# Patient Record
Sex: FEMALE | Employment: UNEMPLOYED | URBAN - METROPOLITAN AREA
[De-identification: names, ages, dates, MRNs, and addresses within clinical notes are randomized per-mention and may not be internally consistent; named-entity substitution may affect disease eponyms.]

---

## 2017-09-11 PROCEDURE — 84480 ASSAY TRIIODOTHYRONINE (T3): CPT | Performed by: INTERNAL MEDICINE

## 2017-09-19 PROBLEM — Z17.0 MALIGNANT NEOPLASM OF LEFT BREAST IN FEMALE, ESTROGEN RECEPTOR POSITIVE, UNSPECIFIED SITE OF BREAST (HCC): Status: ACTIVE | Noted: 2017-09-19

## 2017-09-19 PROBLEM — C50.912 MALIGNANT NEOPLASM OF LEFT BREAST IN FEMALE, ESTROGEN RECEPTOR POSITIVE, UNSPECIFIED SITE OF BREAST (HCC): Status: ACTIVE | Noted: 2017-09-19

## 2017-09-19 PROBLEM — C50.912 MALIGNANT NEOPLASM OF LEFT BREAST IN FEMALE, ESTROGEN RECEPTOR POSITIVE, UNSPECIFIED SITE OF BREAST: Status: ACTIVE | Noted: 2017-09-19

## 2017-09-19 PROBLEM — C50.912 MALIGNANT NEOPLASM OF LEFT BREAST IN FEMALE, ESTROGEN RECEPTOR POSITIVE, UNSPECIFIED SITE OF BREAST  (HCC): Status: ACTIVE | Noted: 2017-09-19

## 2017-09-19 PROBLEM — Z17.0 MALIGNANT NEOPLASM OF LEFT BREAST IN FEMALE, ESTROGEN RECEPTOR POSITIVE, UNSPECIFIED SITE OF BREAST  (HCC): Status: ACTIVE | Noted: 2017-09-19

## 2017-09-19 PROBLEM — Z17.0 MALIGNANT NEOPLASM OF LEFT BREAST IN FEMALE, ESTROGEN RECEPTOR POSITIVE, UNSPECIFIED SITE OF BREAST: Status: ACTIVE | Noted: 2017-09-19

## 2018-04-17 PROCEDURE — 82607 VITAMIN B-12: CPT | Performed by: INTERNAL MEDICINE

## 2018-04-17 PROCEDURE — 84439 ASSAY OF FREE THYROXINE: CPT | Performed by: INTERNAL MEDICINE

## 2018-04-17 PROCEDURE — 82746 ASSAY OF FOLIC ACID SERUM: CPT | Performed by: INTERNAL MEDICINE

## 2018-04-17 PROCEDURE — 82533 TOTAL CORTISOL: CPT | Performed by: INTERNAL MEDICINE

## 2018-06-18 PROBLEM — H69.93 EUSTACHIAN TUBE DYSFUNCTION, BILATERAL: Status: ACTIVE | Noted: 2018-06-18

## 2018-06-18 PROBLEM — J30.1 NON-SEASONAL ALLERGIC RHINITIS DUE TO POLLEN: Status: ACTIVE | Noted: 2018-06-18

## 2018-06-18 PROBLEM — H69.83 EUSTACHIAN TUBE DYSFUNCTION, BILATERAL: Status: ACTIVE | Noted: 2018-06-18

## 2019-05-23 PROBLEM — E03.9 HYPOTHYROIDISM: Status: ACTIVE | Noted: 2019-05-23

## 2021-01-20 PROCEDURE — 88305 TISSUE EXAM BY PATHOLOGIST: CPT | Performed by: OBSTETRICS & GYNECOLOGY

## 2021-03-29 DIAGNOSIS — Z11.59 SPECIAL SCREENING EXAMINATION FOR UNSPECIFIED VIRAL DISEASE: Primary | ICD-10-CM

## 2021-03-31 ENCOUNTER — LAB SERVICES (OUTPATIENT)
Dept: LAB | Age: 51
End: 2021-03-31

## 2021-03-31 PROCEDURE — U0005 INFEC AGEN DETEC AMPLI PROBE: HCPCS | Performed by: INTERNAL MEDICINE

## 2021-03-31 PROCEDURE — U0003 INFECTIOUS AGENT DETECTION BY NUCLEIC ACID (DNA OR RNA); SEVERE ACUTE RESPIRATORY SYNDROME CORONAVIRUS 2 (SARS-COV-2) (CORONAVIRUS DISEASE [COVID-19]), AMPLIFIED PROBE TECHNIQUE, MAKING USE OF HIGH THROUGHPUT TECHNOLOGIES AS DESCRIBED BY CMS-2020-01-R: HCPCS | Performed by: INTERNAL MEDICINE

## 2021-04-01 LAB
SARS-COV-2 RNA RESP QL NAA+PROBE: NOT DETECTED
SERVICE CMNT-IMP: NORMAL
SERVICE CMNT-IMP: NORMAL

## 2021-10-07 PROBLEM — M18.0 PRIMARY OSTEOARTHRITIS OF BOTH FIRST CARPOMETACARPAL JOINTS: Status: ACTIVE | Noted: 2021-10-07

## 2021-10-08 PROBLEM — R76.8 SS-B ANTIBODY POSITIVE: Status: ACTIVE | Noted: 2021-10-08

## 2022-02-17 PROBLEM — R05.9 COUGH: Status: ACTIVE | Noted: 2022-02-17

## 2023-06-06 ENCOUNTER — OFFICE VISIT (OUTPATIENT)
Dept: INTERNAL MEDICINE CLINIC | Facility: CLINIC | Age: 53
End: 2023-06-06
Payer: COMMERCIAL

## 2023-06-06 ENCOUNTER — LAB ENCOUNTER (OUTPATIENT)
Dept: LAB | Age: 53
End: 2023-06-06
Attending: INTERNAL MEDICINE
Payer: COMMERCIAL

## 2023-06-06 VITALS
DIASTOLIC BLOOD PRESSURE: 70 MMHG | WEIGHT: 169 LBS | HEIGHT: 61 IN | HEART RATE: 69 BPM | SYSTOLIC BLOOD PRESSURE: 124 MMHG | BODY MASS INDEX: 31.91 KG/M2 | RESPIRATION RATE: 18 BRPM | OXYGEN SATURATION: 100 %

## 2023-06-06 DIAGNOSIS — F32.A ANXIETY AND DEPRESSION: ICD-10-CM

## 2023-06-06 DIAGNOSIS — E66.9 CLASS 1 OBESITY WITH SERIOUS COMORBIDITY AND BODY MASS INDEX (BMI) OF 31.0 TO 31.9 IN ADULT, UNSPECIFIED OBESITY TYPE: ICD-10-CM

## 2023-06-06 DIAGNOSIS — F41.9 ANXIETY AND DEPRESSION: ICD-10-CM

## 2023-06-06 DIAGNOSIS — E03.8 OTHER SPECIFIED HYPOTHYROIDISM: ICD-10-CM

## 2023-06-06 DIAGNOSIS — Z51.81 THERAPEUTIC DRUG MONITORING: ICD-10-CM

## 2023-06-06 DIAGNOSIS — E78.5 DYSLIPIDEMIA: ICD-10-CM

## 2023-06-06 DIAGNOSIS — R53.83 OTHER FATIGUE: ICD-10-CM

## 2023-06-06 DIAGNOSIS — Z85.3 HISTORY OF BREAST CANCER: Primary | ICD-10-CM

## 2023-06-06 DIAGNOSIS — Z85.3 HISTORY OF BREAST CANCER: ICD-10-CM

## 2023-06-06 LAB
FOLATE SERPL-MCNC: 11 NG/ML (ref 8.7–?)
VIT B12 SERPL-MCNC: 481 PG/ML (ref 193–986)
VIT D+METAB SERPL-MCNC: 48.4 NG/ML (ref 30–100)

## 2023-06-06 PROCEDURE — 3074F SYST BP LT 130 MM HG: CPT | Performed by: INTERNAL MEDICINE

## 2023-06-06 PROCEDURE — 3078F DIAST BP <80 MM HG: CPT | Performed by: INTERNAL MEDICINE

## 2023-06-06 PROCEDURE — 99204 OFFICE O/P NEW MOD 45 MIN: CPT | Performed by: INTERNAL MEDICINE

## 2023-06-06 PROCEDURE — 82746 ASSAY OF FOLIC ACID SERUM: CPT | Performed by: INTERNAL MEDICINE

## 2023-06-06 PROCEDURE — 82306 VITAMIN D 25 HYDROXY: CPT | Performed by: INTERNAL MEDICINE

## 2023-06-06 PROCEDURE — 3008F BODY MASS INDEX DOCD: CPT | Performed by: INTERNAL MEDICINE

## 2023-06-06 PROCEDURE — 82607 VITAMIN B-12: CPT | Performed by: INTERNAL MEDICINE

## 2023-06-06 RX ORDER — VIT C/B6/B5/MAGNESIUM/HERB 173 50-5-6-5MG
CAPSULE ORAL
COMMUNITY

## 2023-06-06 RX ORDER — PHENTERMINE HYDROCHLORIDE 15 MG/1
15 CAPSULE ORAL EVERY MORNING
Qty: 30 CAPSULE | Refills: 1 | Status: SHIPPED | OUTPATIENT
Start: 2023-06-06

## 2023-06-06 NOTE — PATIENT INSTRUCTIONS
Plan:  Continue with medications:   Go to the lab for blood work   Follow up with me in 1 month  Schedule follow up appointments: Levi Frank (dietitian) or Lei Cushing (presurgery dietitian)   Check for insurance coverage for dietitian and labwork prior to scheduling appointment. Please try to work on the following dietary changes:  1. Drink lots of water and cut down on soda/juice consumption if soda/juice drinker  2. Eat breakfast daily (within 1 hour)  3. Focus on protein: (15-30 grams with each meal) ie. greek yogurt, cottage cheese, string cheese, hard boiled eggs   4. Healthy snacks: peanut butter and apples, hummus and carrots, yogurt and berries, nuts (1/4 cup), tuna and crackers    Premier protein shakes  Quest bars  Power crunch bars   Siggi yogurt (9% milk fat)   Sargento balanced breaks (cheese and nuts)- without chocolate   5. Reduce carbohydrates which includes sweets as well as rice, pasta, potatoes, bread, corn and instead choose whole grain options or more protein or vegetables. 6. Get a good night of sleep  7. Try to decrease stress in life- headspace / calm    Please download apps:  1. \"My Fitness Pal\" (other options are Lose it or Spark People) to help you to monitor daily dietary intake and you will be able to see if you are eating the right amount of calories, protein, and carbs. When you set the dorinda up chose 1-2 lbs/week as a goal.  2. \"7 minute workout\" to help with exercise/activity which takes 7 minutes of your day and that you can do at home! 3. \"Calm\" which helps with mindfulness, meditation, clarity, sleep, and linda to your daily life. 4. [employer] blog for healthy recipe ideas  5. [employer] for low carb resources    HIGH PROTEIN SNACK IDEAS  -cottage cheese  -plain yogurt  -kefir  -hard-boiled eggs  -natural cheeses  -nuts (measure portion size)   -unsweetened nut butters  -dried edamame   -lizette seeds soaked in water or almond milk  -soy nuts  -cured meats (monitor for sodium issues)   -hummus with vegetables  -bean dip with vegetables    FRUIT  Low carb fruit options   Raspberries: Half a cup (60 grams) contains 3 grams of carbs. Blackberries: Half a cup (70 grams) contains 4 grams of carbs. Strawberries: Half a cup (100 grams) contains 6 grams of carbs. Blueberries: Half a cup (50 grams) contains 6 grams of carbs. Plum: One medium-sized (80 grams) contains 6 grams of carbs.     VEGETABLES  Low carb vegetables

## 2023-06-08 PROBLEM — E66.811 CLASS 1 OBESITY WITH SERIOUS COMORBIDITY AND BODY MASS INDEX (BMI) OF 31.0 TO 31.9 IN ADULT: Status: ACTIVE | Noted: 2023-06-08

## 2023-06-08 PROBLEM — E66.9 CLASS 1 OBESITY WITH SERIOUS COMORBIDITY AND BODY MASS INDEX (BMI) OF 31.0 TO 31.9 IN ADULT: Status: ACTIVE | Noted: 2023-06-08

## 2023-06-12 ENCOUNTER — OFFICE VISIT (OUTPATIENT)
Dept: INTERNAL MEDICINE CLINIC | Facility: CLINIC | Age: 53
End: 2023-06-12
Payer: COMMERCIAL

## 2023-06-12 ENCOUNTER — NURSE ONLY (OUTPATIENT)
Dept: INTERNAL MEDICINE CLINIC | Facility: CLINIC | Age: 53
End: 2023-06-12
Payer: COMMERCIAL

## 2023-06-12 DIAGNOSIS — E66.9 CLASS 1 OBESITY WITH SERIOUS COMORBIDITY AND BODY MASS INDEX (BMI) OF 31.0 TO 31.9 IN ADULT, UNSPECIFIED OBESITY TYPE: ICD-10-CM

## 2023-06-12 DIAGNOSIS — Z51.81 THERAPEUTIC DRUG MONITORING: ICD-10-CM

## 2023-06-12 DIAGNOSIS — E53.8 B12 DEFICIENCY: Primary | ICD-10-CM

## 2023-06-12 DIAGNOSIS — F41.9 ANXIETY AND DEPRESSION: ICD-10-CM

## 2023-06-12 DIAGNOSIS — E03.8 OTHER SPECIFIED HYPOTHYROIDISM: ICD-10-CM

## 2023-06-12 DIAGNOSIS — F32.A ANXIETY AND DEPRESSION: ICD-10-CM

## 2023-06-12 DIAGNOSIS — Z85.3 HISTORY OF BREAST CANCER: ICD-10-CM

## 2023-06-12 PROCEDURE — 97802 MEDICAL NUTRITION INDIV IN: CPT

## 2023-06-12 RX ORDER — CYANOCOBALAMIN 1000 UG/ML
1000 INJECTION, SOLUTION INTRAMUSCULAR; SUBCUTANEOUS ONCE
Status: COMPLETED | OUTPATIENT
Start: 2023-06-12 | End: 2023-06-12

## 2023-06-12 RX ADMIN — CYANOCOBALAMIN 1000 MCG: 1000 INJECTION, SOLUTION INTRAMUSCULAR; SUBCUTANEOUS at 17:09:00

## 2023-07-12 ENCOUNTER — NURSE ONLY (OUTPATIENT)
Dept: INTERNAL MEDICINE CLINIC | Facility: CLINIC | Age: 53
End: 2023-07-12
Payer: COMMERCIAL

## 2023-07-12 DIAGNOSIS — E53.8 B12 DEFICIENCY: Primary | ICD-10-CM

## 2023-07-12 PROCEDURE — 96372 THER/PROPH/DIAG INJ SC/IM: CPT | Performed by: INTERNAL MEDICINE

## 2023-07-12 RX ORDER — CYANOCOBALAMIN 1000 UG/ML
1000 INJECTION, SOLUTION INTRAMUSCULAR; SUBCUTANEOUS ONCE
Status: COMPLETED | OUTPATIENT
Start: 2023-07-12 | End: 2023-07-12

## 2023-07-12 RX ADMIN — CYANOCOBALAMIN 1000 MCG: 1000 INJECTION, SOLUTION INTRAMUSCULAR; SUBCUTANEOUS at 10:40:00

## 2023-07-31 ENCOUNTER — TELEMEDICINE (OUTPATIENT)
Dept: INTERNAL MEDICINE CLINIC | Facility: CLINIC | Age: 53
End: 2023-07-31
Payer: COMMERCIAL

## 2023-07-31 DIAGNOSIS — E53.8 B12 DEFICIENCY: ICD-10-CM

## 2023-07-31 DIAGNOSIS — E03.8 OTHER SPECIFIED HYPOTHYROIDISM: ICD-10-CM

## 2023-07-31 DIAGNOSIS — E66.9 CLASS 1 OBESITY WITH SERIOUS COMORBIDITY AND BODY MASS INDEX (BMI) OF 31.0 TO 31.9 IN ADULT, UNSPECIFIED OBESITY TYPE: ICD-10-CM

## 2023-07-31 DIAGNOSIS — Z51.81 THERAPEUTIC DRUG MONITORING: Primary | ICD-10-CM

## 2023-07-31 DIAGNOSIS — K59.03 DRUG-INDUCED CONSTIPATION: ICD-10-CM

## 2023-07-31 PROCEDURE — 99214 OFFICE O/P EST MOD 30 MIN: CPT | Performed by: INTERNAL MEDICINE

## 2023-07-31 RX ORDER — DIETHYLPROPION HYDROCHLORIDE 75 MG/1
1 TABLET ORAL EVERY MORNING
Qty: 30 TABLET | Refills: 1 | Status: SHIPPED | OUTPATIENT
Start: 2023-07-31 | End: 2023-08-30

## 2023-08-16 ENCOUNTER — NURSE ONLY (OUTPATIENT)
Dept: INTERNAL MEDICINE CLINIC | Facility: CLINIC | Age: 53
End: 2023-08-16
Payer: COMMERCIAL

## 2023-08-16 DIAGNOSIS — E53.8 B12 DEFICIENCY: Primary | ICD-10-CM

## 2023-08-16 PROCEDURE — 96372 THER/PROPH/DIAG INJ SC/IM: CPT | Performed by: INTERNAL MEDICINE

## 2023-08-16 RX ORDER — CYANOCOBALAMIN 1000 UG/ML
1000 INJECTION, SOLUTION INTRAMUSCULAR; SUBCUTANEOUS ONCE
Status: COMPLETED | OUTPATIENT
Start: 2023-08-16 | End: 2023-08-16

## 2023-08-16 RX ADMIN — CYANOCOBALAMIN 1000 MCG: 1000 INJECTION, SOLUTION INTRAMUSCULAR; SUBCUTANEOUS at 09:58:00

## 2023-09-20 ENCOUNTER — OFFICE VISIT (OUTPATIENT)
Dept: INTERNAL MEDICINE CLINIC | Facility: CLINIC | Age: 53
End: 2023-09-20
Payer: COMMERCIAL

## 2023-09-20 VITALS
SYSTOLIC BLOOD PRESSURE: 124 MMHG | BODY MASS INDEX: 31.91 KG/M2 | HEART RATE: 78 BPM | HEIGHT: 61 IN | RESPIRATION RATE: 16 BRPM | DIASTOLIC BLOOD PRESSURE: 70 MMHG | WEIGHT: 169 LBS

## 2023-09-20 DIAGNOSIS — F32.A ANXIETY AND DEPRESSION: ICD-10-CM

## 2023-09-20 DIAGNOSIS — Z51.81 THERAPEUTIC DRUG MONITORING: Primary | ICD-10-CM

## 2023-09-20 DIAGNOSIS — E66.9 CLASS 1 OBESITY WITH SERIOUS COMORBIDITY AND BODY MASS INDEX (BMI) OF 31.0 TO 31.9 IN ADULT, UNSPECIFIED OBESITY TYPE: ICD-10-CM

## 2023-09-20 DIAGNOSIS — K59.03 DRUG-INDUCED CONSTIPATION: ICD-10-CM

## 2023-09-20 DIAGNOSIS — E53.8 B12 DEFICIENCY: ICD-10-CM

## 2023-09-20 DIAGNOSIS — F41.9 ANXIETY AND DEPRESSION: ICD-10-CM

## 2023-09-20 DIAGNOSIS — R53.83 OTHER FATIGUE: ICD-10-CM

## 2023-09-20 PROCEDURE — 99214 OFFICE O/P EST MOD 30 MIN: CPT | Performed by: INTERNAL MEDICINE

## 2023-09-20 PROCEDURE — 3078F DIAST BP <80 MM HG: CPT | Performed by: INTERNAL MEDICINE

## 2023-09-20 PROCEDURE — 96372 THER/PROPH/DIAG INJ SC/IM: CPT | Performed by: INTERNAL MEDICINE

## 2023-09-20 PROCEDURE — 3074F SYST BP LT 130 MM HG: CPT | Performed by: INTERNAL MEDICINE

## 2023-09-20 PROCEDURE — 3008F BODY MASS INDEX DOCD: CPT | Performed by: INTERNAL MEDICINE

## 2023-09-20 RX ORDER — CYANOCOBALAMIN 1000 UG/ML
1000 INJECTION, SOLUTION INTRAMUSCULAR; SUBCUTANEOUS ONCE
Status: COMPLETED | OUTPATIENT
Start: 2023-09-20 | End: 2023-09-20

## 2023-09-20 RX ORDER — DIETHYLPROPION HYDROCHLORIDE 75 MG/1
TABLET ORAL
COMMUNITY
Start: 2023-08-29 | End: 2023-09-20

## 2023-09-20 RX ORDER — DIETHYLPROPION HYDROCHLORIDE 75 MG/1
TABLET ORAL
Qty: 30 TABLET | Refills: 2 | Status: SHIPPED | OUTPATIENT
Start: 2023-09-20

## 2023-09-20 RX ADMIN — CYANOCOBALAMIN 1000 MCG: 1000 INJECTION, SOLUTION INTRAMUSCULAR; SUBCUTANEOUS at 10:05:00

## 2023-11-14 ENCOUNTER — OFFICE VISIT (OUTPATIENT)
Dept: INTEGRATIVE MEDICINE | Facility: CLINIC | Age: 53
End: 2023-11-14
Payer: COMMERCIAL

## 2023-11-14 VITALS
HEIGHT: 61 IN | WEIGHT: 173.63 LBS | DIASTOLIC BLOOD PRESSURE: 62 MMHG | HEART RATE: 81 BPM | OXYGEN SATURATION: 98 % | SYSTOLIC BLOOD PRESSURE: 118 MMHG | BODY MASS INDEX: 32.78 KG/M2

## 2023-11-14 DIAGNOSIS — G47.00 INSOMNIA, UNSPECIFIED TYPE: ICD-10-CM

## 2023-11-14 DIAGNOSIS — R79.89 LOW VITAMIN D LEVEL: ICD-10-CM

## 2023-11-14 DIAGNOSIS — E34.8 ENDOCRINE AXIS DYSFUNCTION: ICD-10-CM

## 2023-11-14 DIAGNOSIS — K59.00 CONSTIPATION, UNSPECIFIED CONSTIPATION TYPE: ICD-10-CM

## 2023-11-14 DIAGNOSIS — R63.8 CRAVING FOR PARTICULAR FOOD: ICD-10-CM

## 2023-11-14 DIAGNOSIS — E03.8 HYPOTHYROIDISM DUE TO HASHIMOTO'S THYROIDITIS: Primary | ICD-10-CM

## 2023-11-14 DIAGNOSIS — E06.3 HYPOTHYROIDISM DUE TO HASHIMOTO'S THYROIDITIS: Primary | ICD-10-CM

## 2023-11-14 DIAGNOSIS — M25.50 MULTIPLE JOINT PAIN: ICD-10-CM

## 2023-11-14 DIAGNOSIS — E78.5 HYPERLIPIDEMIA, UNSPECIFIED HYPERLIPIDEMIA TYPE: ICD-10-CM

## 2023-11-14 PROCEDURE — 3078F DIAST BP <80 MM HG: CPT | Performed by: PHYSICIAN ASSISTANT

## 2023-11-14 PROCEDURE — 99214 OFFICE O/P EST MOD 30 MIN: CPT | Performed by: PHYSICIAN ASSISTANT

## 2023-11-14 PROCEDURE — 3074F SYST BP LT 130 MM HG: CPT | Performed by: PHYSICIAN ASSISTANT

## 2023-11-14 PROCEDURE — 3008F BODY MASS INDEX DOCD: CPT | Performed by: PHYSICIAN ASSISTANT

## 2023-11-15 ENCOUNTER — LAB ENCOUNTER (OUTPATIENT)
Dept: LAB | Age: 53
End: 2023-11-15
Attending: PHYSICIAN ASSISTANT
Payer: COMMERCIAL

## 2023-11-15 ENCOUNTER — NURSE ONLY (OUTPATIENT)
Dept: INTERNAL MEDICINE CLINIC | Facility: CLINIC | Age: 53
End: 2023-11-15
Payer: COMMERCIAL

## 2023-11-15 DIAGNOSIS — M25.50 MULTIPLE JOINT PAIN: ICD-10-CM

## 2023-11-15 DIAGNOSIS — K59.00 CONSTIPATION, UNSPECIFIED CONSTIPATION TYPE: ICD-10-CM

## 2023-11-15 DIAGNOSIS — E34.8 ENDOCRINE AXIS DYSFUNCTION: ICD-10-CM

## 2023-11-15 DIAGNOSIS — E06.3 HYPOTHYROIDISM DUE TO HASHIMOTO'S THYROIDITIS: ICD-10-CM

## 2023-11-15 DIAGNOSIS — R63.8 CRAVING FOR PARTICULAR FOOD: ICD-10-CM

## 2023-11-15 DIAGNOSIS — R79.89 LOW VITAMIN D LEVEL: ICD-10-CM

## 2023-11-15 DIAGNOSIS — E03.8 HYPOTHYROIDISM DUE TO HASHIMOTO'S THYROIDITIS: ICD-10-CM

## 2023-11-15 DIAGNOSIS — E53.8 B12 DEFICIENCY: Primary | ICD-10-CM

## 2023-11-15 LAB
DHEA-S SERPL-MCNC: 50.1 UG/DL
ERYTHROCYTE [SEDIMENTATION RATE] IN BLOOD: 22 MM/HR
EST. AVERAGE GLUCOSE BLD GHB EST-MCNC: 126 MG/DL (ref 68–126)
HBA1C MFR BLD: 6 % (ref ?–5.7)
INSULIN SERPL-ACNC: 4.6 MU/L (ref 3–25)
RHEUMATOID FACT SERPL-ACNC: <10 IU/ML (ref ?–15)
T3FREE SERPL-MCNC: 2.59 PG/ML (ref 2.4–4.2)
THYROGLOB SERPL-MCNC: <15 U/ML (ref ?–60)
THYROPEROXIDASE AB SERPL-ACNC: 29 U/ML (ref ?–60)
TSI SER-ACNC: 0.07 MIU/ML (ref 0.36–3.74)
VIT D+METAB SERPL-MCNC: 39.5 NG/ML (ref 30–100)

## 2023-11-15 PROCEDURE — 86200 CCP ANTIBODY: CPT | Performed by: PHYSICIAN ASSISTANT

## 2023-11-15 PROCEDURE — 86376 MICROSOMAL ANTIBODY EACH: CPT | Performed by: PHYSICIAN ASSISTANT

## 2023-11-15 PROCEDURE — 86038 ANTINUCLEAR ANTIBODIES: CPT | Performed by: PHYSICIAN ASSISTANT

## 2023-11-15 PROCEDURE — 86431 RHEUMATOID FACTOR QUANT: CPT | Performed by: PHYSICIAN ASSISTANT

## 2023-11-15 PROCEDURE — 86800 THYROGLOBULIN ANTIBODY: CPT | Performed by: PHYSICIAN ASSISTANT

## 2023-11-15 PROCEDURE — 86628 CANDIDA ANTIBODY: CPT | Performed by: PHYSICIAN ASSISTANT

## 2023-11-15 PROCEDURE — 85652 RBC SED RATE AUTOMATED: CPT | Performed by: PHYSICIAN ASSISTANT

## 2023-11-15 PROCEDURE — 82627 DEHYDROEPIANDROSTERONE: CPT | Performed by: PHYSICIAN ASSISTANT

## 2023-11-15 PROCEDURE — 84481 FREE ASSAY (FT-3): CPT | Performed by: PHYSICIAN ASSISTANT

## 2023-11-15 PROCEDURE — 86225 DNA ANTIBODY NATIVE: CPT | Performed by: PHYSICIAN ASSISTANT

## 2023-11-15 PROCEDURE — 84140 ASSAY OF PREGNENOLONE: CPT | Performed by: PHYSICIAN ASSISTANT

## 2023-11-15 PROCEDURE — 82306 VITAMIN D 25 HYDROXY: CPT | Performed by: PHYSICIAN ASSISTANT

## 2023-11-15 PROCEDURE — 83525 ASSAY OF INSULIN: CPT | Performed by: PHYSICIAN ASSISTANT

## 2023-11-15 PROCEDURE — 84482 T3 REVERSE: CPT | Performed by: PHYSICIAN ASSISTANT

## 2023-11-15 PROCEDURE — 84443 ASSAY THYROID STIM HORMONE: CPT | Performed by: PHYSICIAN ASSISTANT

## 2023-11-15 PROCEDURE — 83036 HEMOGLOBIN GLYCOSYLATED A1C: CPT | Performed by: PHYSICIAN ASSISTANT

## 2023-11-15 PROCEDURE — 96372 THER/PROPH/DIAG INJ SC/IM: CPT | Performed by: NURSE PRACTITIONER

## 2023-11-15 RX ORDER — CYANOCOBALAMIN 1000 UG/ML
1000 INJECTION, SOLUTION INTRAMUSCULAR; SUBCUTANEOUS ONCE
Status: COMPLETED | OUTPATIENT
Start: 2023-11-15 | End: 2023-11-15

## 2023-11-15 RX ADMIN — CYANOCOBALAMIN 1000 MCG: 1000 INJECTION, SOLUTION INTRAMUSCULAR; SUBCUTANEOUS at 14:16:00

## 2023-11-16 LAB
CCP IGG SERPL-ACNC: 1 U/ML (ref 0–6.9)
DSDNA IGG SERPL IA-ACNC: 1 IU/ML
ENA AB SER QL IA: 0.2 UG/L
ENA AB SER QL IA: NEGATIVE

## 2023-11-17 LAB
CANDIDA IGA AB: NEGATIVE
CANDIDA IGG AB: NEGATIVE
CANDIDA IGM AB IGM: NEGATIVE

## 2023-11-18 LAB — REVERSE T3: 13 NG/DL

## 2023-11-21 LAB — PREGNENOLONE: <10 NG/DL

## 2023-12-07 ENCOUNTER — TELEPHONE (OUTPATIENT)
Dept: INTEGRATIVE MEDICINE | Facility: CLINIC | Age: 53
End: 2023-12-07

## 2023-12-07 NOTE — TELEPHONE ENCOUNTER
Pt requested to have lab results reviewed to start supplements depending on results. Pt requested to speak with SIOMARA Carroll and would like for Carly to squeeze her in sooner.

## 2024-01-16 ENCOUNTER — OFFICE VISIT (OUTPATIENT)
Dept: INTEGRATIVE MEDICINE | Facility: CLINIC | Age: 54
End: 2024-01-16
Payer: COMMERCIAL

## 2024-01-16 VITALS
BODY MASS INDEX: 32.47 KG/M2 | SYSTOLIC BLOOD PRESSURE: 102 MMHG | WEIGHT: 172 LBS | HEART RATE: 80 BPM | HEIGHT: 61 IN | OXYGEN SATURATION: 98 % | DIASTOLIC BLOOD PRESSURE: 82 MMHG

## 2024-01-16 DIAGNOSIS — G44.209 TENSION HEADACHE: ICD-10-CM

## 2024-01-16 DIAGNOSIS — G47.00 INSOMNIA, UNSPECIFIED TYPE: ICD-10-CM

## 2024-01-16 DIAGNOSIS — E03.8 HYPOTHYROIDISM DUE TO HASHIMOTO'S THYROIDITIS: ICD-10-CM

## 2024-01-16 DIAGNOSIS — M25.50 MULTIPLE JOINT PAIN: ICD-10-CM

## 2024-01-16 DIAGNOSIS — E06.3 HYPOTHYROIDISM DUE TO HASHIMOTO'S THYROIDITIS: ICD-10-CM

## 2024-01-16 DIAGNOSIS — K59.00 CONSTIPATION, UNSPECIFIED CONSTIPATION TYPE: ICD-10-CM

## 2024-01-16 DIAGNOSIS — E34.8 ENDOCRINE AXIS DYSFUNCTION: Primary | ICD-10-CM

## 2024-01-16 DIAGNOSIS — M72.2 PLANTAR FASCIITIS: ICD-10-CM

## 2024-01-16 PROCEDURE — 3008F BODY MASS INDEX DOCD: CPT | Performed by: PHYSICIAN ASSISTANT

## 2024-01-16 PROCEDURE — 3074F SYST BP LT 130 MM HG: CPT | Performed by: PHYSICIAN ASSISTANT

## 2024-01-16 PROCEDURE — 3079F DIAST BP 80-89 MM HG: CPT | Performed by: PHYSICIAN ASSISTANT

## 2024-01-16 PROCEDURE — 99214 OFFICE O/P EST MOD 30 MIN: CPT | Performed by: PHYSICIAN ASSISTANT

## 2024-01-16 RX ORDER — GABAPENTIN 100 MG/1
100 CAPSULE ORAL 3 TIMES DAILY
COMMUNITY
Start: 2023-12-05

## 2024-01-16 RX ORDER — HYDROCODONE BITARTRATE AND ACETAMINOPHEN 5; 325 MG/1; MG/1
1 TABLET ORAL EVERY 6 HOURS PRN
COMMUNITY
Start: 2023-12-11

## 2024-01-16 NOTE — PROGRESS NOTES
Basia Ding is a 53 year old female.  Chief Complaint   Patient presents with    Follow - Up     Lab results       HPI:   53-year-old female patient with known h/o breast cancer presents today for f/u on chronic fatigue, constipation, mult joint pain.    Labs done 11/15/23.  -Low Vit D (39.5), was on 2000ius (OTC) when labs taken  -A1c 6.0, insulin 4.6    Today was 6wk post operative breast explant.   Was on Gabapentin, muscle relaxers and pain meds, now completely discontinued.     Started Anti-Inflammatory Food Plan with  January 1, 2024.  Has lost 5lbs and feels much better, less pain, less inflammation.   Started the Relora around the same time.    Hypothyroidism (2015) - Fatigue and brain fog improved. Has been on Levothyroxine 75mcg. When she initially went on med, her cold intolerance resolved. Endocrinologist just ran TSH/Free T4 and TSH was low.     -Mult joint pain (hands, knees, ankles) -she reports seeing a Rheumatologist who didn't feel she needed to see him further for her joint pain  -Negative inflammatory markers  -More recently having left plantar fasciitis pain    --Chronic Constipation better with RenewLife Gentle Reset: Mg oxide, aloe, rhubard, slippery elm, marshmallow, triphala once daily.     Tension headaches are better but inquiring about a pain relief option when she does get them.  Describes them as bandlike across front of forehead.  Denies aura or nausea.    Pert Med Hx:  2014 - Bilat double mastectomy due to breast cancer. Suspects Breast Implant Illness. 12/5/23 had explant with [employer].      2021 -Menopause- still with hot flashes, vaginal dryness, insomnia.    -Low pregnenolone, DHEA-s             Not needing as much Advil for neck pain, headaches. (Has seen Neuro)      REVIEW OF SYSTEMS:   Review of Systems   Constitutional:  Negative for chills, fatigue and fever.   Eyes: Negative.  Negative for visual disturbance.   Respiratory: Negative.  Negative for cough,  shortness of breath and wheezing.    Cardiovascular: Negative.  Negative for chest pain.   Gastrointestinal:  Positive for constipation.   Endocrine: Negative.    Genitourinary: Negative.    Musculoskeletal:  Positive for arthralgias.        Plantar foot pain   Skin: Negative.    Allergic/Immunologic: Negative.    Neurological:  Positive for headaches. Negative for weakness.   Hematological: Negative.    Psychiatric/Behavioral:  Positive for sleep disturbance (improving).             FAMILY HISTORY:      Family History   Problem Relation Age of Onset    Breast Cancer Paternal Aunt     Other (Lung Cancer) Mother     Other (Other) Father         accident       MEDICAL HISTORY:     Past Medical History:   Diagnosis Date    ASCUS of cervix with negative high risk HPV 02/2020    Breast cancer (HCC)     Breast neoplasm, Tis (LCIS), left     Frequent headaches     Hypothyroidism     Obesity        CURRENT MEDICATIONS:     Current Outpatient Medications   Medication Sig Dispense Refill    levothyroxine 75 MCG Oral Tab Take 1 tablet (75 mcg total) by mouth before breakfast. 75mcg Mon-Sat 72 tablet 3    desvenlafaxine  MG Oral Tablet 24 Hr Take 1 tablet (100 mg total) by mouth daily. 90 tablet 3    ALPRAZolam 0.5 MG Oral Tab TAKE 1 TABLET BY MOUTH THREE TIMES DAILY AS NEEDED FOR ANXIETY OR INSOMNIA 30 tablet 0    HYDROcodone-acetaminophen 5-325 MG Oral Tab Take 1 tablet by mouth every 6 (six) hours as needed. (Patient not taking: Reported on 1/16/2024)      gabapentin 100 MG Oral Cap Take 1 capsule (100 mg total) by mouth 3 (three) times daily. (Patient not taking: Reported on 1/16/2024)      Diethylpropion HCl ER 75 MG Oral Tablet 24 Hr Take one tablet by mouth per day (Patient not taking: Reported on 1/16/2024) 30 tablet 2    Turmeric (QC TUMERIC COMPLEX) 500 MG Oral Cap Take by mouth. (Patient not taking: Reported on 1/16/2024)      cholecalciferol 400 units/mL Oral Liquid Take by mouth daily. (Patient not taking:  Reported on 1/16/2024)      ASHWAGANDHA OR Take by mouth. (Patient not taking: Reported on 1/16/2024)      fluticasone propionate 50 MCG/ACT Nasal Suspension 1 spray by Each Nare route daily. (Patient not taking: Reported on 1/16/2024) 16 g 0    pseudoephedrine ER (SUDAFED 12 HOUR) 120 MG Oral Tablet 12 Hr Take 1 tablet (120 mg total) by mouth daily. (Patient not taking: Reported on 11/14/2023) 10 tablet 0    valACYclovir 1 G Oral Tab TAKE 1 TABLET BY MOUTH EVERY 12 HOURS DURING OUTBREAK (Patient not taking: Reported on 1/16/2024) 30 tablet 2    Azelastine HCl 0.1 % Nasal Solution 1 spray by Nasal route 2 (two) times daily. (Patient not taking: Reported on 1/16/2024) 30 mL 2    Vitamin C 500 MG Oral Tab Take 1 tablet (500 mg total) by mouth daily. (Patient not taking: Reported on 11/14/2023)         SOCIAL HISTORY:   Lifestyle Factors affecting health:  Diet -   Started Anti-Inflammatory Food Plan. Cooks a lot at home, eats healthy, and feels she doesn't eat very much.   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 Good experience, but felt it became an obsession.       Exercise - nothing formal in last 2-6mos, but is active. Feet were hurting when doing the gym classes.     Sleep - Sleeping better despite  snoring -  has sleep apnea and is being reevaluated for CPAP.    Lives with  and 2 college grad sons.   Social History     Socioeconomic History    Marital status:    Tobacco Use    Smoking status: Never    Smokeless tobacco: Never   Vaping Use    Vaping Use: Never used   Substance and Sexual Activity    Alcohol use: Yes     Alcohol/week: 0.0 standard drinks of alcohol    Drug use: No    Sexual activity: Yes     Partners: Male     Birth control/protection: Vasectomy       SURGICAL HISTORY:     Past Surgical History:   Procedure Laterality Date    Colonoscopy  2019    wnl per pt; rpt 10 yrs    Create eardrum opening,gen anesth      left ear tube 15 years  ago    Mastectomy left  2014    Mastectomy right  2014          x 3    Other surgical history  2015    breast reconstruction    Removal of breast implant Bilateral 2023       PHYSICAL EXAM:     Vitals:    24 1514   BP: 102/82   BP Location: Right arm   Patient Position: Sitting   Cuff Size: adult   Pulse: 80   SpO2: 98%   Weight: 172 lb (78 kg)   Height: 5' 1\" (1.549 m)         Physical Exam  Vitals reviewed.   Constitutional:       General: She is not in acute distress.     Appearance: Normal appearance.   HENT:      Mouth/Throat:      Mouth: Mucous membranes are moist.      Pharynx: Oropharynx is clear.      Comments: Very slight white texture  Eyes:      Extraocular Movements: Extraocular movements intact.      Conjunctiva/sclera: Conjunctivae normal.   Cardiovascular:      Rate and Rhythm: Normal rate and regular rhythm.      Pulses: Normal pulses.      Heart sounds: Normal heart sounds. No murmur heard.  Pulmonary:      Effort: Pulmonary effort is normal.      Breath sounds: Normal breath sounds.   Musculoskeletal:         General: No swelling or deformity.   Skin:     General: Skin is warm and dry.      Comments: Bilat scars on chest - healing very well.    Neurological:      General: No focal deficit present.      Mental Status: She is alert and oriented to person, place, and time.   Psychiatric:         Mood and Affect: Mood normal.         Behavior: Behavior normal.         Thought Content: Thought content normal.         Judgment: Judgment normal.          ASSESSMENT AND PLAN:     [facility] Lab Encounter on 11/15/2023   Component Date Value Ref Range Status    TSH 11/15/2023 0.073 (L)  0.358 - 3.740 mIU/mL Final    This test may exhibit interference when a sample is collected from a person who is consuming high dose of biotin (a.k.a., vitamin B7, vitamin H, coenzyme R) supplements resulting in serum concentrations >100 ng/mL.  Intake of the recommended daily allowance (RDA) for biotin  (0.03 mg) has not been shown to typically cause significant interference; however, high dose daily dietary supplements may contain biotin concentrations greater than 150 times (5-10 mg) the RDA.  It is recommended that physicians ask all patients who may be on biotin supplementation to stop biotin consumption at least 72 hours prior to collection of a new sample.      Anti-Thyroperoxidase 11/15/2023 29  <60 U/mL Final    Anti-Thyroglobulin 11/15/2023 <15  <60 U/mL Final    This test may exhibit interference when a sample is collected from a person who is consuming high dose of biotin (a.k.a., vitamin B7, vitamin H, coenzyme R) supplements resulting in serum concentrations >=100 ng/mL, leading to falsely depressed Thyroglobulin results (32% - 37% decrease).  Intake of the recommended daily allowance (RDA) for biotin (0.03 mg) has not been shown to typically cause significant interference; however, high dose daily dietary supplements may contain biotin concentrations greater than 150 times (5-10 mg) the RDA.  It is recommended that physicians ask all patients who may be on biotin supplementation to stop biotin consumption at least 72 hours prior to collection of a new sample.      T3 Free 11/15/2023 2.59  2.40 - 4.20 pg/mL Final    This test may exhibit interference when a sample is collected from a person who is consuming high dose of biotin (a.k.a., vitamin B7, vitamin H, coenzyme R) supplements resulting in serum concentrations >100 ng/mL.  Intake of the recommended daily allowance (RDA) for biotin (0.03 mg) has not been shown to typically cause significant interference; however, high dose daily dietary supplements may contain biotin concentrations greater than 150 times (5-10 mg) the RDA.  It is recommended that physicians ask all patients who may be on biotin supplementation to stop biotin consumption at least 72 hours prior to collection of a new sample.      Candida IgG Ab 11/15/2023 Negative  Negative Final     Candida IgM Ab IgM 11/15/2023 Negative  Negative Final                  **Please note reference interval change**    Candida IgA Ab 11/15/2023 Negative  Negative Final                  **Please note reference interval change**    Insulin 11/15/2023 4.6  3.0 - 25.0 mU/L Final    HgbA1C 11/15/2023 6.0 (H)  <5.7 % Final     Normal HbA1C:     <5.7%      Pre-Diabetic:     5.7 - 6.4%      Diabetic:         >6.4%      Diabetic Control: <7.0%        Estimated Average Glucose 11/15/2023 126  68 - 126 mg/dL Final    eAG is the estimated average glucose calculated from Hgb A1c according to the formula recommended by the American Diabetes Association. eAG levels reflect the long term average glucose and may not correlate with random or fasting glucose levels since these represent specific points in time.           Sed Rate 11/15/2023 22  0 - 30 mm/Hr Final    Expanded ISAEL Antibody Screen, IGG 11/15/2023 0.20  <0.7 ug/l Final    Anti-dsDNA antibody 11/15/2023 1.0  <10 IU/mL Final    Connective Tissue Disease Screen I* 11/15/2023 Negative  Negative Final    DHEA Sulfate 11/15/2023 50.1  25.9 - 460.2 ug/dL Final    This test may exhibit interference of greater than 10% when a sample is collected from a person who is consuming high dose of biotin (a.k.a., vitamin B7, vitamin H, coenzyme R) supplements resulting in serum concentrations &gt;12.5 ng/mL, leading to either falsely elevated or falsely depressed results.  Intake of the recommended daily allowance (RDA) for biotin (0.03 mg) has not been shown to typically cause significant interference; however, high dose daily dietary supplements may contain biotin concentrations greater than 150 times (5-10 mg) the RDA.  It is recommended that physicians ask all patients who may be on biotin supplementation to stop biotin consumption at least 72 hours prior to collection of a new sample.    C-Citrullinated Peptide IgG AB 11/15/2023 1.0  0.0 - 6.9 U/mL Final    Rheumatoid Factor  11/15/2023 <10  <15 IU/mL Final    Vitamin D, 25OH, Total 11/15/2023 39.5  30.0 - 100.0 ng/mL Final    Literature Recommendations for 25(OH)D levels are:  Range           Vitamin D Status   <20    ng/mL      Deficiency   20-<30 ng/mL      Insufficiency    ng/mL      Sufficiency   >100   ng/mL      Toxicity    *Clinical controversy exists regarding optimal 25(OH)D levels. Emerging evidence links potential adverse effects to high levels, particularly >60 ng/mL.        Reverse T3 11/15/2023 13.0  9.2 - 24.1 ng/dL Final    Pregnenolone 11/15/2023 <10  ng/dL Final    This test was developed and its performance characteristics  determined by [facility]. It has not been cleared or approved  by the Food and Drug Administration.  Reference Range:  Adults: <151      No results found.    1. Endocrine axis dysfunction    2. Hypothyroidism due to Hashimoto's thyroiditis    3. Multiple joint pain    4. Constipation, unspecified constipation type    5. Insomnia, unspecified type    6. Tension headache    7. Plantar fasciitis        Time spent with patient: Over 30 minutes spent in chart review and in direct communication with patient obtaining and reviewing history, creating a unique care plan, explaining the rationale for treatment, reviewing potential SE and overall treatment plan,  documenting all clinical information in Epic. Over 50% of this time was in education, counseling and coordination of care.     Problem List Items Addressed This Visit          Endocrine and Metabolic    Hypothyroidism       Sleep    Insomnia    Relevant Medications    HYDROcodone-acetaminophen 5-325 MG Oral Tab    gabapentin 100 MG Oral Cap     Other Visit Diagnoses       Endocrine axis dysfunction    -  Primary    Multiple joint pain        Relevant Medications    HYDROcodone-acetaminophen 5-325 MG Oral Tab    gabapentin 100 MG Oral Cap    Constipation, unspecified constipation type        Tension headache        Relevant Medications     HYDROcodone-acetaminophen 5-325 MG Oral Tab    gabapentin 100 MG Oral Cap    Plantar fasciitis        Relevant Medications    HYDROcodone-acetaminophen 5-325 MG Oral Tab    gabapentin 100 MG Oral Cap             Reviewed labs done in November.  Reviewed all current supplements patient brought in today.    Endocrine:  Hypothyroid - currently monitored through Endocrinologist.  Postmenopausal with hormone deficiency symptoms.  H/O breast cancer    GI and weight:  Has successfully lost 5 pounds since starting the new food plan.  Chronic Constipation x2yrs.   Continue Gentle Reset every night as needed.  Continue Relora for cravings and stress.   -> May try Ortho spore probiotic    Immune:  -Elevated TED in the past and Mult joint pain persists, but has improved significantly with implementation of anti-inflammatory food plan.    -Inflammatory markers were negative and the labs.  -Low vitamin D -> recommend with K2 as below    Musculoskeletal:  Tension headaches:   -> May try castor oil application over headache to reduce inflammation and pain  -> Electrolytes as recommended below    Planter fasciitis:  -> Calf and foot stretching  -> May apply castor oil pack here as well and or Arnica oil    Given further recommendations as below    Orders Placed This Visit:  No orders of the defined types were placed in this encounter.    No orders of the defined types were placed in this encounter.      Patient Instructions   Vitamin D3 + K from [employer]    Provides 5,000 IU per softgel of vitamin D (as D3), designed for greater absorption. This high-potency formula also includes bioavailable forms of vitamin K2 (menaquinone-7) to complement vitamin D.    Suggested Use:  Take one softgel daily     2. Turmeric for inflammation and pain:  -[employer] Turmeric Supreme, OR  -MegaFood Turmeric Curcumin Extra Strength, OR  -Garden of Life Mykind Extra Strength Turmeric  Take as directed      3. Ortho Spore Complete by [employer]  Products    Helps Gently Restore and Maintain Microbial Balance and Diversity  Reduces Gas, Bloating, and Food Intolerance  Improves Occasional Constipation and Diarrhea  Maintains GI Barrier Integrity    Suggested Use: Take 1 capsule at bedtime     OR Garden of Life probiotic.     4. Arnica Oil on your feet for pain and inflammation. Weleda a good brand.       5. Catalyte Lemon Lime NSF by Eugenie Landry helps you restore electrolyte balance.* The electrolyte mix is designed to replenish the micronutrients lost through sweat during exercise and athletic performance. Studies suggest that an amino acid-electrolyte formula can better increase cellular rehydration compared to formulas without amino acids.* The blend of electrolytes and micronutrients includes taurine, a conditionally essential amino acid that supports rehydration at the cellular level.*   Catalyte is low in calories and is perfect before, during, and after a workout. Simply mix one scoop with water.  Benefits:    Balanced electrolytes helps fight fatigue, promotes calorie control, and helps fuel your muscles and brain.*    Potassium promotes fast recovery and helps reduce cramps.*    Replenishes important minerals in the body that are lost during high intensity activity. Taurine has been shown to promote hydration at the cellular level, which supports performance and recovery.     6. Castor oil directly over forehead or castor oil packs as detailed here:  [email or web address]         No follow-ups on file.    Patient affirmed understanding of plan and all questions were answered.     Carly Lyon PA-C

## 2024-01-16 NOTE — PATIENT INSTRUCTIONS
Vitamin D3 + K from [employer]    Provides 5,000 IU per softgel of vitamin D (as D3), designed for greater absorption. This high-potency formula also includes bioavailable forms of vitamin K2 (menaquinone-7) to complement vitamin D.    Suggested Use:  Take one softgel daily     2. Turmeric for inflammation and pain:  -[employer] Turmeric Supreme, OR  -MegaFood Turmeric Curcumin Extra Strength, OR  -Garden of Life Mykind Extra Strength Turmeric  Take as directed      3. Ortho Spore Complete by [employer]    Helps Gently Restore and Maintain Microbial Balance and Diversity  Reduces Gas, Bloating, and Food Intolerance  Improves Occasional Constipation and Diarrhea  Maintains GI Barrier Integrity    Suggested Use: Take 1 capsule at bedtime     OR Garden of Life probiotic.     4. Arnica Oil on your feet for pain and inflammation. Weleda a good brand.       5. Catalyte Lemon Lime NSF by Eugenie Landry helps you restore electrolyte balance.* The electrolyte mix is designed to replenish the micronutrients lost through sweat during exercise and athletic performance. Studies suggest that an amino acid-electrolyte formula can better increase cellular rehydration compared to formulas without amino acids.* The blend of electrolytes and micronutrients includes taurine, a conditionally essential amino acid that supports rehydration at the cellular level.*   Catalyte is low in calories and is perfect before, during, and after a workout. Simply mix one scoop with water.  Benefits:    Balanced electrolytes helps fight fatigue, promotes calorie control, and helps fuel your muscles and brain.*    Potassium promotes fast recovery and helps reduce cramps.*    Replenishes important minerals in the body that are lost during high intensity activity. Taurine has been shown to promote hydration at the cellular level, which supports performance and recovery.     6. Castor oil directly over forehead or castor oil packs as  detailed here:  [email or web address]

## 2024-04-23 ENCOUNTER — OFFICE VISIT (OUTPATIENT)
Dept: INTEGRATIVE MEDICINE | Facility: CLINIC | Age: 54
End: 2024-04-23
Payer: COMMERCIAL

## 2024-04-23 VITALS
BODY MASS INDEX: 32.06 KG/M2 | WEIGHT: 169.81 LBS | HEART RATE: 72 BPM | OXYGEN SATURATION: 97 % | SYSTOLIC BLOOD PRESSURE: 112 MMHG | HEIGHT: 61 IN | DIASTOLIC BLOOD PRESSURE: 70 MMHG

## 2024-04-23 DIAGNOSIS — R79.89 LOW VITAMIN D LEVEL: ICD-10-CM

## 2024-04-23 DIAGNOSIS — M19.072 ARTHRITIS OF LEFT FOOT: ICD-10-CM

## 2024-04-23 DIAGNOSIS — R73.03 PREDIABETES: ICD-10-CM

## 2024-04-23 DIAGNOSIS — R23.2 HOT FLASHES: ICD-10-CM

## 2024-04-23 DIAGNOSIS — E03.8 HYPOTHYROIDISM DUE TO HASHIMOTO'S THYROIDITIS: Primary | ICD-10-CM

## 2024-04-23 DIAGNOSIS — E06.3 HYPOTHYROIDISM DUE TO HASHIMOTO'S THYROIDITIS: Primary | ICD-10-CM

## 2024-04-23 DIAGNOSIS — E34.8 ENDOCRINE AXIS DYSFUNCTION: ICD-10-CM

## 2024-04-23 PROCEDURE — 3078F DIAST BP <80 MM HG: CPT | Performed by: PHYSICIAN ASSISTANT

## 2024-04-23 PROCEDURE — 99214 OFFICE O/P EST MOD 30 MIN: CPT | Performed by: PHYSICIAN ASSISTANT

## 2024-04-23 PROCEDURE — 3008F BODY MASS INDEX DOCD: CPT | Performed by: PHYSICIAN ASSISTANT

## 2024-04-23 PROCEDURE — 3074F SYST BP LT 130 MM HG: CPT | Performed by: PHYSICIAN ASSISTANT

## 2024-04-23 RX ORDER — MELOXICAM 15 MG/1
15 TABLET ORAL DAILY
COMMUNITY
Start: 2024-04-08

## 2024-04-23 NOTE — PATIENT INSTRUCTIONS
Ligament Restore from pure encapsulations:    Ligament Restore is a blend of collagen and essential vitamins to help maintain healthy tendons, ligaments and joints. Ligament Restore helps to support the natural repair process of healthy connective tissue.    Supports collagen formation for healthy tendons, ligaments and joints  May help recovery from strains, sprains and overall wear and tear  Provides glucosamine sulfate as a building block for tendons, ligaments, and joints; enhances the natural repair processes of healthy connective tissue; protects cartilage from the effects of mechanical stress  Contains BioCell Collagen®, a naturally occurring blend of hyaluronic acid (HA), chondroitin sulfate, glucosamine sulfate and amino acids to promote cartilage synthesis and joint integrity  Encourages collagen formation with l-lysine and l-proline, forming the “glue” that holds collagen together  Includes silica and vitamin C as cofactors for connective tissue matrix formation and repair  Maintains joint comfort with devil’s claw and curcumin    May take up to take 2 capsules, 2 times daily, with 8-10 oz water, with meals.     2. Bonafide Relizen for hot flashes if related to menopause    3. Fasting labs in the morning

## 2024-04-23 NOTE — PROGRESS NOTES
Basia Ding is a 54 year old female.  Chief Complaint   Patient presents with    Follow - Up     Endocrine axis dysfunction with improvment       HPI:   54-year-old female patient with known h/o breast cancer presents today for f/u on chronic fatigue, constipation, mult joint pain.    Last Labs 11/15/23.  -A1c 6.0, insulin 4.6    Since explant has been feeling better overall - constipation, sleep, energy better.     did get a CPAP so that is helping with sleep.   Saw podiatrist for rt plantar fasciitis - orthotics; arthritis in right foot->arthritis that was keeping her from working out. Started the meloxicam [id number] which helped.  Will have to see PCP to continue Rx.     Has gotten back to seated elliptical and bike.     C/o increased profuse sweating of head and face- during stressful events. Described as hot flashes. A little more anxious lately and will trigger. Denies palpitations, loose stool, hair loss.   Hypothyroid - levels last checked in Nov. Sees Endocrinologist once yearly. On levothyroxine 75 mcg.    GI - daily to every other day BM   ------------------------------  Lifestyle Factors affecting health:  Diet -   More follows Anti-Inflammatory Food Plan. Cooks a lot at home, eats healthy, and feels she doesn't eat very much.   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 Good experience, but felt it became an obsession.     3- 16oz dolan daily;   dr freod prather 1-2 daily   Brandon     Exercise - see above     Sleep - Sleeping better     Stress - not too bad     -----------------Prior OV note 1/16/24-------------------------------------------------  Today was 6wk post operative breast explant.   Was on Gabapentin, muscle relaxers and pain meds, now completely discontinued.     Started Anti-Inflammatory Food Plan with  January 1, 2024.  Has lost 5lbs and feels much better, less pain, less inflammation.   Started the Relora around the same  time.    Hypothyroidism (2015) - Fatigue and brain fog improved. Has been on Levothyroxine 75mcg. When she initially went on med, her cold intolerance resolved. Endocrinologist just ran TSH/Free T4 and TSH was low.     -Mult joint pain (hands, knees, ankles) -she reports seeing a Rheumatologist who didn't feel she needed to see him further for her joint pain  -Negative inflammatory markers  -More recently having left plantar fasciitis pain    --Chronic Constipation better with RenewLife Gentle Reset: Mg oxide, aloe, rhubard, slippery elm, marshmallow, triphala once daily.     Tension headaches are better but inquiring about a pain relief option when she does get them.  Describes them as bandlike across front of forehead.  Denies aura or nausea.    Pert Med Hx:  2014 - Bilat double mastectomy due to breast cancer. Suspects Breast Implant Illness. 12/5/23 had explant with [employer].      2021 -Menopause- still with hot flashes, vaginal dryness, insomnia.    -Low pregnenolone, DHEA-s             Not needing as much Advil for neck pain, headaches. (Has seen Neuro)      REVIEW OF SYSTEMS:   Review of Systems     Negative except for pertinent ROS in HPI      FAMILY HISTORY:      Family History   Problem Relation Age of Onset    Breast Cancer Paternal Aunt     Other (Lung Cancer) Mother     Other (Other) Father         accident       MEDICAL HISTORY:     Past Medical History:    ASCUS of cervix with negative high risk HPV    Breast cancer (HCC)    Breast neoplasm, Tis (LCIS), left    Frequent headaches    Hypothyroidism    Obesity       CURRENT MEDICATIONS:     Current Outpatient Medications   Medication Sig Dispense Refill    Meloxicam 15 MG Oral Tab Take 1 tablet (15 mg total) by mouth daily.      fluticasone propionate 50 MCG/ACT Nasal Suspension 1 spray by Each Nare route daily. 16 g 0    levothyroxine 75 MCG Oral Tab Take 1 tablet (75 mcg total) by mouth before breakfast. 75mcg Mon-Sat 72 tablet 3     desvenlafaxine  MG Oral Tablet 24 Hr Take 1 tablet (100 mg total) by mouth daily. 90 tablet 3    ALPRAZolam 0.5 MG Oral Tab TAKE 1 TABLET BY MOUTH THREE TIMES DAILY AS NEEDED FOR ANXIETY OR INSOMNIA 30 tablet 0    Turmeric (QC TUMERIC COMPLEX) 500 MG Oral Cap Take by mouth. (Patient not taking: Reported on 2024)      ASHWAGANDHA OR Take by mouth. (Patient not taking: Reported on 2024)      pseudoephedrine ER (SUDAFED 12 HOUR) 120 MG Oral Tablet 12 Hr Take 1 tablet (120 mg total) by mouth daily. (Patient not taking: Reported on 2023) 10 tablet 0    valACYclovir 1 G Oral Tab TAKE 1 TABLET BY MOUTH EVERY 12 HOURS DURING OUTBREAK (Patient not taking: Reported on 2024) 30 tablet 2    Azelastine HCl 0.1 % Nasal Solution 1 spray by Nasal route 2 (two) times daily. (Patient not taking: Reported on 2024) 30 mL 2    Vitamin C 500 MG Oral Tab Take 1 tablet (500 mg total) by mouth daily. (Patient not taking: Reported on 2023)         SOCIAL HISTORY:     Lives with  and 2 college grad sons.   Social History     Socioeconomic History    Marital status:    Tobacco Use    Smoking status: Never    Smokeless tobacco: Never   Vaping Use    Vaping status: Never Used   Substance and Sexual Activity    Alcohol use: Yes     Alcohol/week: 0.0 standard drinks of alcohol    Drug use: No    Sexual activity: Yes     Partners: Male     Birth control/protection: Vasectomy     Social Determinants of Health      [facility] from [facility], [facility]    Housing Stability       SURGICAL HISTORY:     Past Surgical History:   Procedure Laterality Date    Colonoscopy      wnl per pt; rpt 10 yrs    Create eardrum opening,gen anesth      left ear tube 15 years ago    Mastectomy left  2014    Mastectomy right  2014          x 3    Other surgical history  2015    breast reconstruction    Removal of breast implant Bilateral 2023       PHYSICAL  EXAM:     Vitals:    04/23/24 1339   BP: 112/70   Pulse: 72   SpO2: 97%   Weight: 169 lb 12.8 oz (77 kg)   Height: 5' 1\" (1.549 m)           Physical Exam  Vitals reviewed.   Constitutional:       General: She is not in acute distress.     Appearance: Normal appearance.   HENT:      Mouth/Throat:      Mouth: Mucous membranes are moist.      Pharynx: Oropharynx is clear.      Comments: Very slight white texture  Eyes:      Extraocular Movements: Extraocular movements intact.      Conjunctiva/sclera: Conjunctivae normal.   Cardiovascular:      Rate and Rhythm: Normal rate and regular rhythm.      Pulses: Normal pulses.      Heart sounds: Normal heart sounds. No murmur heard.  Pulmonary:      Effort: Pulmonary effort is normal.      Breath sounds: Normal breath sounds.   Abdominal:      General: Bowel sounds are normal.   Musculoskeletal:         General: No swelling or deformity.   Skin:     General: Skin is warm and dry.      Comments: Bilat scars on chest - healing very well.    Neurological:      General: No focal deficit present.      Mental Status: She is alert and oriented to person, place, and time.      Deep Tendon Reflexes: Abnormal reflex: pt too guarded to grade.   Psychiatric:         Mood and Affect: Mood normal.         Behavior: Behavior normal.         Thought Content: Thought content normal.         Judgment: Judgment normal.          ASSESSMENT AND PLAN:     [facility] Lab Encounter on 11/15/2023   Component Date Value Ref Range Status    TSH 11/15/2023 0.073 (L)  0.358 - 3.740 mIU/mL Final    This test may exhibit interference when a sample is collected from a person who is consuming high dose of biotin (a.k.a., vitamin B7, vitamin H, coenzyme R) supplements resulting in serum concentrations >100 ng/mL.  Intake of the recommended daily allowance (RDA) for biotin (0.03 mg) has not been shown to typically cause significant interference; however, high dose daily dietary supplements may contain biotin  concentrations greater than 150 times (5-10 mg) the RDA.  It is recommended that physicians ask all patients who may be on biotin supplementation to stop biotin consumption at least 72 hours prior to collection of a new sample.      Anti-Thyroperoxidase 11/15/2023 29  <60 U/mL Final    Anti-Thyroglobulin 11/15/2023 <15  <60 U/mL Final    This test may exhibit interference when a sample is collected from a person who is consuming high dose of biotin (a.k.a., vitamin B7, vitamin H, coenzyme R) supplements resulting in serum concentrations >=100 ng/mL, leading to falsely depressed Thyroglobulin results (32% - 37% decrease).  Intake of the recommended daily allowance (RDA) for biotin (0.03 mg) has not been shown to typically cause significant interference; however, high dose daily dietary supplements may contain biotin concentrations greater than 150 times (5-10 mg) the RDA.  It is recommended that physicians ask all patients who may be on biotin supplementation to stop biotin consumption at least 72 hours prior to collection of a new sample.      T3 Free 11/15/2023 2.59  2.40 - 4.20 pg/mL Final    This test may exhibit interference when a sample is collected from a person who is consuming high dose of biotin (a.k.a., vitamin B7, vitamin H, coenzyme R) supplements resulting in serum concentrations >100 ng/mL.  Intake of the recommended daily allowance (RDA) for biotin (0.03 mg) has not been shown to typically cause significant interference; however, high dose daily dietary supplements may contain biotin concentrations greater than 150 times (5-10 mg) the RDA.  It is recommended that physicians ask all patients who may be on biotin supplementation to stop biotin consumption at least 72 hours prior to collection of a new sample.      Candida IgG Ab 11/15/2023 Negative  Negative Final    Candida IgM Ab IgM 11/15/2023 Negative  Negative Final                  **Please note reference interval change**    Candida IgA Ab  11/15/2023 Negative  Negative Final                  **Please note reference interval change**    Insulin 11/15/2023 4.6  3.0 - 25.0 mU/L Final    HgbA1C 11/15/2023 6.0 (H)  <5.7 % Final     Normal HbA1C:     <5.7%      Pre-Diabetic:     5.7 - 6.4%      Diabetic:         >6.4%      Diabetic Control: <7.0%        Estimated Average Glucose 11/15/2023 126  68 - 126 mg/dL Final    eAG is the estimated average glucose calculated from Hgb A1c according to the formula recommended by the American Diabetes Association. eAG levels reflect the long term average glucose and may not correlate with random or fasting glucose levels since these represent specific points in time.           Sed Rate 11/15/2023 22  0 - 30 mm/Hr Final    Expanded ISAEL Antibody Screen, IGG 11/15/2023 0.20  <0.7 ug/l Final    Anti-dsDNA antibody 11/15/2023 1.0  <10 IU/mL Final    Connective Tissue Disease Screen I* 11/15/2023 Negative  Negative Final    DHEA Sulfate 11/15/2023 50.1  25.9 - 460.2 ug/dL Final    This test may exhibit interference of greater than 10% when a sample is collected from a person who is consuming high dose of biotin (a.k.a., vitamin B7, vitamin H, coenzyme R) supplements resulting in serum concentrations &gt;12.5 ng/mL, leading to either falsely elevated or falsely depressed results.  Intake of the recommended daily allowance (RDA) for biotin (0.03 mg) has not been shown to typically cause significant interference; however, high dose daily dietary supplements may contain biotin concentrations greater than 150 times (5-10 mg) the RDA.  It is recommended that physicians ask all patients who may be on biotin supplementation to stop biotin consumption at least 72 hours prior to collection of a new sample.    C-Citrullinated Peptide IgG AB 11/15/2023 1.0  0.0 - 6.9 U/mL Final    Rheumatoid Factor 11/15/2023 <10  <15 IU/mL Final    Vitamin D, 25OH, Total 11/15/2023 39.5  30.0 - 100.0 ng/mL Final    Literature Recommendations for 25(OH)D  levels are:  Range           Vitamin D Status   <20    ng/mL      Deficiency   20-<30 ng/mL      Insufficiency    ng/mL      Sufficiency   >100   ng/mL      Toxicity    *Clinical controversy exists regarding optimal 25(OH)D levels. Emerging evidence links potential adverse effects to high levels, particularly >60 ng/mL.        Reverse T3 11/15/2023 13.0  9.2 - 24.1 ng/dL Final    Pregnenolone 11/15/2023 <10  ng/dL Final    This test was developed and its performance characteristics  determined by [facility]. It has not been cleared or approved  by the Food and Drug Administration.  Reference Range:  Adults: <151      No results found.    1. Endocrine axis dysfunction  - Free T4, (Free Thyroxine); Future  - Assay, Thyroid Stim Hormone; Future  - Free T3 (Triiodothryronine); Future  - Reverse T3, Serum; Future  - Thyroid Peroxidase (TPO) AB; Future  - Thyroid Antithyroglobulin AB; Future  - Insulin; Future  - Hemoglobin A1C; Future  - Dehydroepiandrosterone Sulfate; Future  - Pregnenolone by MS/MS, Serum; Future    2. Hypothyroidism due to Hashimoto's thyroiditis    3. Low vitamin D level    4. Hot flashes  - Free T4, (Free Thyroxine); Future  - Assay, Thyroid Stim Hormone; Future  - Free T3 (Triiodothryronine); Future  - Reverse T3, Serum; Future  - Thyroid Peroxidase (TPO) AB; Future  - Thyroid Antithyroglobulin AB; Future  - Vitamin D; Future    5. Arthritis of left foot    6. Prediabetes  - Insulin; Future  - Hemoglobin A1C; Future          Time spent with patient: Over 45 minutes spent in chart review and in direct communication with patient obtaining and reviewing history, creating a unique care plan, explaining the rationale for treatment, reviewing potential SE and overall treatment plan,  documenting all clinical information in Epic. Over 50% of this time was in education, counseling and coordination of care.     Problem List Items Addressed This Visit          Endocrine and Metabolic    Hypothyroidism -  Primary    Relevant Medications    Meloxicam 15 MG Oral Tab     Other Visit Diagnoses       Endocrine axis dysfunction        Relevant Orders    Free T4, (Free Thyroxine)    Assay, Thyroid Stim Hormone    Free T3 (Triiodothryronine)    Reverse T3, Serum    Thyroid Peroxidase (TPO) AB    Thyroid Antithyroglobulin AB    Insulin    Hemoglobin A1C    Dehydroepiandrosterone Sulfate    Pregnenolone by MS/MS, Serum    Low vitamin D level        Hot flashes        Relevant Orders    Free T4, (Free Thyroxine)    Assay, Thyroid Stim Hormone    Free T3 (Triiodothryronine)    Reverse T3, Serum    Thyroid Peroxidase (TPO) AB    Thyroid Antithyroglobulin AB    Vitamin D    Arthritis of left foot        Relevant Medications    Meloxicam 15 MG Oral Tab    Prediabetes        Relevant Orders    Insulin    Hemoglobin A1C            Endocrine:  Hypothyroid - currently monitored through Endocrinologist, but questioning if she is still controlled on the medication since surgery and changes in diet and lifestyle in the last 4 months.  Experiencing hot flashes, profuse sweating, and slightly increased anxiety.  -> Recheck labs at this time    Complicated by the fact she is postmenopausal with hormone deficiency symptoms.  H/O breast cancer      GI and weight:  Has had good improvement since changing diet and working out more.    Musculoskeletal:  Left foot pain secondary to arthritis.  Meloxicam was helpful but not sure she would want to take long-term.  She will discuss this with her PCP.  -> Consider ligament restore    Given further recommendations as below    Orders Placed This Visit:  Orders Placed This Encounter   Procedures    Free T4, (Free Thyroxine)    Assay, Thyroid Stim Hormone    Free T3 (Triiodothryronine)    Reverse T3, Serum    Thyroid Peroxidase (TPO) AB    Thyroid Antithyroglobulin AB    Vitamin D    Insulin    Hemoglobin A1C    Dehydroepiandrosterone Sulfate    Pregnenolone by MS/MS, Serum     No orders of the defined  types were placed in this encounter.      Patient Instructions   Ligament Restore from pure encapsulations:    Ligament Restore is a blend of collagen and essential vitamins to help maintain healthy tendons, ligaments and joints. Ligament Restore helps to support the natural repair process of healthy connective tissue.    Supports collagen formation for healthy tendons, ligaments and joints  May help recovery from strains, sprains and overall wear and tear  Provides glucosamine sulfate as a building block for tendons, ligaments, and joints; enhances the natural repair processes of healthy connective tissue; protects cartilage from the effects of mechanical stress  Contains BioCell Collagen®, a naturally occurring blend of hyaluronic acid (HA), chondroitin sulfate, glucosamine sulfate and amino acids to promote cartilage synthesis and joint integrity  Encourages collagen formation with l-lysine and l-proline, forming the “glue” that holds collagen together  Includes silica and vitamin C as cofactors for connective tissue matrix formation and repair  Maintains joint comfort with devil’s claw and curcumin    May take up to take 2 capsules, 2 times daily, with 8-10 oz water, with meals.     2. Bonafide Relizen for hot flashes if related to menopause    3. Fasting labs in the morning       Return in about 4 months (around 8/23/2024) for x60min : thyroid, arthtiris in foot, hot flashes.    Patient affirmed understanding of plan and all questions were answered.     Carly Lyon PA-C

## 2024-04-24 ENCOUNTER — LAB ENCOUNTER (OUTPATIENT)
Dept: LAB | Age: 54
End: 2024-04-24
Attending: PHYSICIAN ASSISTANT
Payer: COMMERCIAL

## 2024-04-24 DIAGNOSIS — R73.03 PREDIABETES: ICD-10-CM

## 2024-04-24 DIAGNOSIS — E34.8 ENDOCRINE AXIS DYSFUNCTION: ICD-10-CM

## 2024-04-24 DIAGNOSIS — R23.2 HOT FLASHES: ICD-10-CM

## 2024-04-24 LAB
DHEA-S SERPL-MCNC: 49 UG/DL
EST. AVERAGE GLUCOSE BLD GHB EST-MCNC: 123 MG/DL (ref 68–126)
HBA1C MFR BLD: 5.9 % (ref ?–5.7)
INSULIN SERPL-ACNC: 12 MU/L (ref 3–25)
T3FREE SERPL-MCNC: 2 PG/ML (ref 2.4–4.2)
T4 FREE SERPL-MCNC: 0.7 NG/DL (ref 0.8–1.7)
THYROGLOB SERPL-MCNC: <15 U/ML (ref ?–60)
THYROPEROXIDASE AB SERPL-ACNC: <28 U/ML (ref ?–60)
TSI SER-ACNC: 0.42 MIU/ML (ref 0.36–3.74)
VIT D+METAB SERPL-MCNC: 48.5 NG/ML (ref 30–100)

## 2024-04-24 PROCEDURE — 83036 HEMOGLOBIN GLYCOSYLATED A1C: CPT | Performed by: PHYSICIAN ASSISTANT

## 2024-04-24 PROCEDURE — 84439 ASSAY OF FREE THYROXINE: CPT | Performed by: PHYSICIAN ASSISTANT

## 2024-04-24 PROCEDURE — 84140 ASSAY OF PREGNENOLONE: CPT | Performed by: PHYSICIAN ASSISTANT

## 2024-04-24 PROCEDURE — 82306 VITAMIN D 25 HYDROXY: CPT | Performed by: PHYSICIAN ASSISTANT

## 2024-04-24 PROCEDURE — 86800 THYROGLOBULIN ANTIBODY: CPT | Performed by: PHYSICIAN ASSISTANT

## 2024-04-24 PROCEDURE — 84482 T3 REVERSE: CPT | Performed by: PHYSICIAN ASSISTANT

## 2024-04-24 PROCEDURE — 82627 DEHYDROEPIANDROSTERONE: CPT | Performed by: PHYSICIAN ASSISTANT

## 2024-04-24 PROCEDURE — 86376 MICROSOMAL ANTIBODY EACH: CPT | Performed by: PHYSICIAN ASSISTANT

## 2024-04-24 PROCEDURE — 84481 FREE ASSAY (FT-3): CPT | Performed by: PHYSICIAN ASSISTANT

## 2024-04-24 PROCEDURE — 83525 ASSAY OF INSULIN: CPT | Performed by: PHYSICIAN ASSISTANT

## 2024-04-24 PROCEDURE — 84443 ASSAY THYROID STIM HORMONE: CPT | Performed by: PHYSICIAN ASSISTANT

## 2024-04-28 LAB — REVERSE T3: 10.3 NG/DL

## 2024-05-04 LAB — PREGNENOLONE: 31 NG/DL

## 2024-12-30 ENCOUNTER — OFFICE VISIT (OUTPATIENT)
Dept: INTERNAL MEDICINE CLINIC | Facility: CLINIC | Age: 54
End: 2024-12-30
Payer: COMMERCIAL

## 2024-12-30 VITALS — WEIGHT: 174.63 LBS | HEIGHT: 61 IN | BODY MASS INDEX: 32.97 KG/M2

## 2024-12-30 DIAGNOSIS — E66.811 CLASS 1 OBESITY WITH SERIOUS COMORBIDITY AND BODY MASS INDEX (BMI) OF 31.0 TO 31.9 IN ADULT, UNSPECIFIED OBESITY TYPE: Primary | ICD-10-CM

## 2024-12-30 PROCEDURE — 3008F BODY MASS INDEX DOCD: CPT

## 2024-12-30 PROCEDURE — 97803 MED NUTRITION INDIV SUBSEQ: CPT

## 2024-12-30 NOTE — PATIENT INSTRUCTIONS
Goals:     1.  Keep a food record, My Net Diary, select the macros dashboard.  2.  Strive to consume at least 4-6 meals/snacks per day.  Include protein and produce when you eat.  Aim  for 62-93 grams of protein per day.  Try to keep the carbohydrates at 100-120 grams per day or less.    3.  Practice some mindful eating strategies, chew food 20-30 times before swallowing, eat food slowly.  Make the meals last 30 minutes.  4.  Aim for 64 oz per day of water.  (Try Protein 2 O water, adding True Lemon, Crystal Light, use a measured container).  5.  Taper caffeine.  6.  Exercise with a goal of 30 minutes per day for exercise (for example,walking).   7.  Strength training 10 minutes 3 days per week.  (7 minute workout song on You Tube) or (Gym Rat no more-18 at home exercises)

## 2024-12-30 NOTE — PROGRESS NOTES
INITIAL OUTPATIENT NUTRITION CONSULTATION        Nutrition Assessment    Medical Diagnosis: Obesity and hypothyroidism    Physical Findings: joint pain and energy level getting better, gets  8 hours of sleep per night    Client Age and Gender: 54 year old female    Marital Status and Occupation: Lives with  and one of her children 23 yo son.   Not currently working since 2020.      Labs:   No components found for: \"HGBA1C\"  Triglycerides   Date Value Ref Range Status   08/19/2020 73.00 <=150.00 mg/dL Final     Calculated LDL   Date Value Ref Range Status   08/19/2020 161 (H) <=130 mg/dL Final     Direct HDL   Date Value Ref Range Status   08/19/2020 62 >=40 mg/dL Final     Comment:     Guidelines for high density lipoprotein (HDL) are adapted from the National Cholesterol Education Program (NCEP).Values >=40 mg/dL are considered a negative risk factor for coronary heart disease (CHD) and are considered protective.     AST   Date Value Ref Range Status   09/17/2021 21 0 - 32 U/L Final     ALT   Date Value Ref Range Status   09/17/2021 18 0 - 33 U/L Final         Height:  Ht Readings from Last 1 Encounters:   04/23/24 5' 1\" (1.549 m)       Weight:   Wt Readings from Last 2 Encounters:   04/23/24 169 lb 12.8 oz (77 kg)   01/16/24 172 lb (78 kg)       BMI Readings from Last 1 Encounters:   04/23/24 32.08 kg/m²       Weight change: Increase of 5.6 lbs in the past 15 months ago    Goal weight/Health status:145 lbs per pt    Diet/Weight History: :Per pt with excess weight since in 2015 was 4 lbs higher than current weight, did get to 137 lbs using [employer] and working out with  and maintained until 2020.  In 2020 quit job at [employer] - kept gaining weight .  Past year not a good year had surgery to remove breast implants- had double mastectomy 2014, health started to decline felt breast implants where causing joint pain, headaches. Per pt last year had pity party-had to accept what body is like-is still  healing.  This December decided it was time -fatigue subsided.  Was going to [employer]-now has class for 55+ - is going twice per week.  Went back to , is going once per week. Went to new place for tx for hypothyroidism- diabetes endocrinology in [place].Started on metformin Nov 13, 2024 due to Hgb A1C slightly high, will f/u in February-mgmt thyroid and DM.    Current Diet: calorie reduction    Food/Beverage Intake:     Breakfast: protein collagen with 1-2 cups of regular coffee and fairlife protein shake, yogurt and hb eggs or skips   Snack: skips  Lunch: noon-orange and hb egg or skips  Snack:skips  Dinner: 4 pm-leftovers or salmon or chicken or pork chop and vegetable or fahita bowl with brown rice  Snack: chocolate candy or ice cream  Beverages: water 64 oz per day and Dr. Pepper Zero 1-2 cans per day    Vitamins/mineral supplements:vit D otc, tumeric, probiotic    Meal pattern: 1-3 meals/d, 2-3 or more snacks/d    Number of meals/week eaten at restaurants: no        Alcohol Intake: very rarely once per month 1 drink    Estimated current caloric intake: 1700 cals/d    Estimated caloric needs for weight loss: 1700-801=7852 cals/d for 1 pounds/week weight loss    Physical Activity: 2 times per week HIT (strength program) and walks -12-15K steps per day      Food Journal: WW [email or web address]    Spent 59 minutes in consultation with the patient.      Nutrition Intervention/Education:  Comprehensive nutrition education and evaluation provided for weight loss. Met with pt for initial visit. Pt is being followed by Dr. Cox for medical weight management.  Pt is taking metformin for manangement of A1C with another provider.  Per pt excess weight began Per pt with excess weight since in 2015 was 4 lbs higher than current weight, did get to 137 lbs using [employer] and working out with  and maintained until 2020.  In 2020 quit job at  - kept gaining weight .  Past year not a good year had surgery to remove  breast implants- had double mastectomy 2014, health started to decline felt breast implants where causing joint pain, headaches. Per pt last year had pity party-had to accept what body is like-is still healing.  This December decided it was time -fatigue subsided.  Was going to [employer]-now has class for 55+ - is going twice per week.  Went back to , is going once per week. Went to new place for tx for hypothyroidism- diabetes endocrinology in [place].Started on metformin Nov 13, 2024 due to Hgb A1C slightly high, will f/u in February-mgmt thyroid and DM.. Per diet recall, suspect diet is low in protein on some days and higher in carbs.  Discussed benefits of including protein and produce with meals and snacks. Provided ideas and guidance.  Pt verbalized understanding.  Discussed mindful eating strategies.  Pt verbalized understanding. Pt is staying well hydrated.  Per pt is doing regular exercise.  Patient agreed to goals below.    Goals:     1.  Keep a food record, My Net Diary, select the [employer] dashboard.  2.  Strive to consume at least 4-6 meals/snacks per day.  Include protein and produce when you eat.  Aim  for 62-93 grams of protein per day.  Try to keep the carbohydrates at 100-120 grams per day or less.    3.  Practice some mindful eating strategies, chew food 20-30 times before swallowing, eat food slowly.  Make the meals last 30 minutes.  4.  Aim for 64 oz per day of water.  (Try Protein 2 O water, adding True Lemon, Crystal Light, use a measured container).  5.  Taper caffeine.  6.  Exercise with a goal of 30 minutes per day for exercise (for example,walking).   7.  Strength training 10 minutes 3 days per week.  (7 minute workout song on You Tube) or (Gym Rat no more-18 at home exercises)     Monitoring/Evaluation:  Follow up appt scheduled with dietitian          Lizzeth Doll RD, LDN

## 2025-05-27 RX ORDER — CLINDAMYCIN HYDROCHLORIDE 300 MG/1
300 CAPSULE ORAL 3 TIMES DAILY
Qty: 30 CAPSULE | Refills: 0 | Status: SHIPPED | OUTPATIENT
Start: 2025-05-27 | End: 2025-06-06